# Patient Record
Sex: FEMALE | Race: WHITE | URBAN - METROPOLITAN AREA
[De-identification: names, ages, dates, MRNs, and addresses within clinical notes are randomized per-mention and may not be internally consistent; named-entity substitution may affect disease eponyms.]

---

## 2018-03-13 ENCOUNTER — APPOINTMENT (RX ONLY)
Dept: URBAN - METROPOLITAN AREA CLINIC 41 | Facility: CLINIC | Age: 73
Setting detail: DERMATOLOGY
End: 2018-03-13

## 2018-03-13 DIAGNOSIS — D485 NEOPLASM OF UNCERTAIN BEHAVIOR OF SKIN: ICD-10-CM

## 2018-03-13 DIAGNOSIS — L82.1 OTHER SEBORRHEIC KERATOSIS: ICD-10-CM

## 2018-03-13 DIAGNOSIS — Z71.89 OTHER SPECIFIED COUNSELING: ICD-10-CM

## 2018-03-13 DIAGNOSIS — L30.4 ERYTHEMA INTERTRIGO: ICD-10-CM

## 2018-03-13 DIAGNOSIS — L82.0 INFLAMED SEBORRHEIC KERATOSIS: ICD-10-CM

## 2018-03-13 PROBLEM — D48.5 NEOPLASM OF UNCERTAIN BEHAVIOR OF SKIN: Status: ACTIVE | Noted: 2018-03-13

## 2018-03-13 PROCEDURE — 99203 OFFICE O/P NEW LOW 30 MIN: CPT | Mod: 25

## 2018-03-13 PROCEDURE — 11100: CPT

## 2018-03-13 PROCEDURE — ? COUNSELING

## 2018-03-13 PROCEDURE — ? PRESCRIPTION

## 2018-03-13 PROCEDURE — ? BIOPSY BY SHAVE METHOD

## 2018-03-13 RX ORDER — KETOCONAZOLE 20 MG/G
CREAM TOPICAL QD
Qty: 1 | Refills: 1 | Status: ERX | COMMUNITY
Start: 2018-03-13

## 2018-03-13 RX ORDER — DESONIDE 0.5 MG/G
CREAM TOPICAL QD
Qty: 1 | Refills: 1 | Status: ERX | COMMUNITY
Start: 2018-03-13

## 2018-03-13 RX ADMIN — KETOCONAZOLE 1: 20 CREAM TOPICAL at 00:00

## 2018-03-13 RX ADMIN — DESONIDE 1: 0.5 CREAM TOPICAL at 00:00

## 2018-03-13 ASSESSMENT — LOCATION SIMPLE DESCRIPTION DERM
LOCATION SIMPLE: ABDOMEN
LOCATION SIMPLE: RIGHT UPPER BACK
LOCATION SIMPLE: CHEST
LOCATION SIMPLE: LEFT BREAST
LOCATION SIMPLE: RIGHT BREAST

## 2018-03-13 ASSESSMENT — LOCATION DETAILED DESCRIPTION DERM
LOCATION DETAILED: RIGHT SUPERIOR UPPER BACK
LOCATION DETAILED: LEFT RIB CAGE
LOCATION DETAILED: RIGHT LATERAL SUPERIOR CHEST
LOCATION DETAILED: RIGHT LATERAL BREAST 8-9:00 REGION
LOCATION DETAILED: LEFT INFRAMAMMARY CREASE (INNER QUADRANT)
LOCATION DETAILED: RIGHT MEDIAL UPPER BACK
LOCATION DETAILED: RIGHT INFERIOR LATERAL UPPER BACK
LOCATION DETAILED: RIGHT RIB CAGE

## 2018-03-13 ASSESSMENT — LOCATION ZONE DERM: LOCATION ZONE: TRUNK

## 2018-03-13 NOTE — PROCEDURE: BIOPSY BY SHAVE METHOD
Bill For Surgical Tray: no
Biopsy Type: H and E
Silver Nitrate Text: The wound bed was treated with silver nitrate after the biopsy was performed.
Wound Care: Vaseline
Electrodesiccation And Curettage Text: The wound bed was treated with electrodesiccation and curettage after the biopsy was performed.
Dressing: Band-Aid
Hemostasis: Drysol
Detail Level: Detailed
Notification Instructions: Patient will be notified of biopsy results. However, patient instructed to call the office if not contacted within 2 weeks.
Additional Anesthesia Volume In Cc (Will Not Render If 0): 0
Type Of Destruction Used: Silver Nitrate
Size Of Lesion In Cm: 0.6
Anesthesia Volume In Cc: 1
Post-Care Instructions: I reviewed with the patient in detail post-care instructions. Patient is to keep the biopsy site dry overnight, and then apply bacitracin twice daily until healed. Patient may apply hydrogen peroxide soaks to remove any crusting.
Consent: Written consent was obtained and risks were reviewed including but not limited to scarring, infection, bleeding, scabbing, incomplete removal, nerve damage and allergy to anesthesia.
Cryotherapy Text: The wound bed was treated with cryotherapy after the biopsy was performed.
Curettage Text: The wound bed was treated with curettage after the biopsy was performed.
Electrodesiccation Text: The wound bed was treated with electrodesiccation after the biopsy was performed.
Biopsy Method: Personna blade
Anesthesia Type: 1% lidocaine without epinephrine
Billing Type: Third-Party Bill

## 2018-03-28 ENCOUNTER — APPOINTMENT (RX ONLY)
Dept: URBAN - METROPOLITAN AREA CLINIC 41 | Facility: CLINIC | Age: 73
Setting detail: DERMATOLOGY
End: 2018-03-28

## 2018-03-28 DIAGNOSIS — L82.0 INFLAMED SEBORRHEIC KERATOSIS: ICD-10-CM

## 2018-03-28 DIAGNOSIS — L30.9 DERMATITIS, UNSPECIFIED: ICD-10-CM | Status: WORSENING

## 2018-03-28 PROBLEM — L29.8 OTHER PRURITUS: Status: ACTIVE | Noted: 2018-03-28

## 2018-03-28 PROBLEM — L57.0 ACTINIC KERATOSIS: Status: ACTIVE | Noted: 2018-03-28

## 2018-03-28 PROBLEM — Z85.828 PERSONAL HISTORY OF OTHER MALIGNANT NEOPLASM OF SKIN: Status: ACTIVE | Noted: 2018-03-28

## 2018-03-28 PROBLEM — L23.7 ALLERGIC CONTACT DERMATITIS DUE TO PLANTS, EXCEPT FOOD: Status: ACTIVE | Noted: 2018-03-28

## 2018-03-28 PROBLEM — D48.5 NEOPLASM OF UNCERTAIN BEHAVIOR OF SKIN: Status: ACTIVE | Noted: 2018-03-28

## 2018-03-28 PROCEDURE — ? PRESCRIPTION

## 2018-03-28 PROCEDURE — ? TREATMENT REGIMEN

## 2018-03-28 PROCEDURE — 99213 OFFICE O/P EST LOW 20 MIN: CPT | Mod: 25

## 2018-03-28 PROCEDURE — ? LIQUID NITROGEN

## 2018-03-28 PROCEDURE — ? COUNSELING

## 2018-03-28 PROCEDURE — 17110 DESTRUCTION B9 LES UP TO 14: CPT

## 2018-03-28 RX ORDER — NYSTATIN 100000 [USP'U]/G
1 CREAM TOPICAL BID
Qty: 2 | Refills: 2 | Status: ERX

## 2018-03-28 ASSESSMENT — LOCATION ZONE DERM: LOCATION ZONE: TRUNK

## 2018-03-28 ASSESSMENT — LOCATION SIMPLE DESCRIPTION DERM
LOCATION SIMPLE: ABDOMEN
LOCATION SIMPLE: LEFT BREAST
LOCATION SIMPLE: RIGHT BREAST

## 2018-03-28 ASSESSMENT — LOCATION DETAILED DESCRIPTION DERM
LOCATION DETAILED: RIGHT INFRAMAMMARY CREASE (INNER QUADRANT)
LOCATION DETAILED: LEFT LATERAL BREAST 5-6:00 REGION
LOCATION DETAILED: RIGHT RIB CAGE
LOCATION DETAILED: RIGHT LATERAL BREAST 7-8:00 REGION
LOCATION DETAILED: LEFT INFRAMAMMARY CREASE (INNER QUADRANT)

## 2018-03-28 NOTE — PROCEDURE: LIQUID NITROGEN
Medical Necessity Clause: This procedure was medically necessary because the lesions that were treated were:
Duration Of Freeze Thaw-Cycle (Seconds): 5-10
Consent: The patient's consent was obtained including but not limited to risks of crusting, scabbing, blistering, scarring, darker or lighter pigmentary change, recurrence, incomplete removal and infection.
Number Of Freeze-Thaw Cycles: 2 freeze-thaw cycles
Add 52 Modifier (Optional): no
Include Z78.9 (Other Specified Conditions Influencing Health Status) As An Associated Diagnosis?: Yes
Post-Care Instructions: I reviewed with the patient in detail post-care instructions. Patient is to wear sunprotection, and avoid picking at any of the treated lesions. Pt may apply Vaseline to crusted or scabbing areas.
Medical Necessity Information: It is in your best interest to select a reason for this procedure from the list below. All of these items fulfill various CMS LCD requirements except the new and changing color options.
Detail Level: Detailed

## 2018-03-28 NOTE — PROCEDURE: TREATMENT REGIMEN
Detail Level: Detailed
Otc Regimen: Sarna Anti-Itch lotion to do liberally as many times a day as needed
Discontinue Regimen: Ketoconazole cream, Desonide cream

## 2018-04-23 ENCOUNTER — APPOINTMENT (RX ONLY)
Dept: URBAN - METROPOLITAN AREA CLINIC 2 | Facility: CLINIC | Age: 73
Setting detail: DERMATOLOGY
End: 2018-04-23

## 2018-04-23 DIAGNOSIS — L82.0 INFLAMED SEBORRHEIC KERATOSIS: ICD-10-CM

## 2018-04-23 DIAGNOSIS — L30.4 ERYTHEMA INTERTRIGO: ICD-10-CM | Status: IMPROVED

## 2018-04-23 PROCEDURE — ? TREATMENT REGIMEN

## 2018-04-23 PROCEDURE — 99213 OFFICE O/P EST LOW 20 MIN: CPT | Mod: 25

## 2018-04-23 PROCEDURE — ? COUNSELING

## 2018-04-23 PROCEDURE — ? LIQUID NITROGEN

## 2018-04-23 PROCEDURE — 17110 DESTRUCTION B9 LES UP TO 14: CPT

## 2018-04-23 PROCEDURE — ? PRESCRIPTION

## 2018-04-23 RX ORDER — NYSTATIN 100000 [USP'U]/G
1 CREAM TOPICAL EVERY OTHER DAY
Qty: 1 | Refills: 2 | Status: ERX

## 2018-04-23 ASSESSMENT — LOCATION DETAILED DESCRIPTION DERM
LOCATION DETAILED: RIGHT RIB CAGE
LOCATION DETAILED: LEFT RIB CAGE
LOCATION DETAILED: SUBXIPHOID
LOCATION DETAILED: RIGHT INFRAMAMMARY CREASE (OUTER QUADRANT)
LOCATION DETAILED: LEFT INFRAMAMMARY CREASE (INNER QUADRANT)
LOCATION DETAILED: RIGHT MEDIAL BREAST 4-5:00 REGION
LOCATION DETAILED: LEFT MEDIAL BREAST 8-9:00 REGION
LOCATION DETAILED: RIGHT INFRAMAMMARY CREASE (INNER QUADRANT)

## 2018-04-23 ASSESSMENT — LOCATION SIMPLE DESCRIPTION DERM
LOCATION SIMPLE: ABDOMEN
LOCATION SIMPLE: LEFT BREAST
LOCATION SIMPLE: RIGHT BREAST

## 2018-04-23 ASSESSMENT — LOCATION ZONE DERM: LOCATION ZONE: TRUNK

## 2018-04-23 NOTE — PROCEDURE: LIQUID NITROGEN
Post-Care Instructions: I reviewed with the patient in detail post-care instructions. Patient is to wear sunprotection, and avoid picking at any of the treated lesions. Pt may apply Vaseline to crusted or scabbing areas.
Detail Level: Detailed
Render Post-Care Instructions In Note?: no
Pared With?: 15 blade
Medical Necessity Information: It is in your best interest to select a reason for this procedure from the list below. All of these items fulfill various CMS LCD requirements except the new and changing color options.
Medical Necessity Clause: This procedure was medically necessary because the lesions that were treated were:
Number Of Freeze-Thaw Cycles: 1 freeze-thaw cycle
Consent: The patient's consent was obtained including but not limited to risks of crusting, scabbing, blistering, scarring, darker or lighter pigmentary change, recurrence, incomplete removal and infection.
Include Z78.9 (Other Specified Conditions Influencing Health Status) As An Associated Diagnosis?: Yes
Duration Of Freeze Thaw-Cycle (Seconds): 5-10

## 2019-06-10 ENCOUNTER — APPOINTMENT (RX ONLY)
Dept: URBAN - METROPOLITAN AREA CLINIC 41 | Facility: CLINIC | Age: 74
Setting detail: DERMATOLOGY
End: 2019-06-10

## 2019-06-10 DIAGNOSIS — L82.0 INFLAMED SEBORRHEIC KERATOSIS: ICD-10-CM

## 2019-06-10 DIAGNOSIS — L30.4 ERYTHEMA INTERTRIGO: ICD-10-CM

## 2019-06-10 PROCEDURE — ? COUNSELING

## 2019-06-10 PROCEDURE — 17110 DESTRUCTION B9 LES UP TO 14: CPT

## 2019-06-10 PROCEDURE — 99213 OFFICE O/P EST LOW 20 MIN: CPT | Mod: 25

## 2019-06-10 PROCEDURE — ? LIQUID NITROGEN

## 2019-06-10 ASSESSMENT — LOCATION DETAILED DESCRIPTION DERM
LOCATION DETAILED: SUBXIPHOID
LOCATION DETAILED: LEFT INFRAMAMMARY CREASE (OUTER QUADRANT)
LOCATION DETAILED: RIGHT INFRAMAMMARY CREASE (INNER QUADRANT)
LOCATION DETAILED: LEFT MID-UPPER BACK
LOCATION DETAILED: LEFT RIB CAGE
LOCATION DETAILED: RIGHT LATERAL UPPER BACK
LOCATION DETAILED: RIGHT MEDIAL BREAST 4-5:00 REGION
LOCATION DETAILED: RIGHT RIB CAGE
LOCATION DETAILED: LEFT AXILLARY TAIL OF BREAST

## 2019-06-10 ASSESSMENT — LOCATION SIMPLE DESCRIPTION DERM
LOCATION SIMPLE: LEFT UPPER BACK
LOCATION SIMPLE: ABDOMEN
LOCATION SIMPLE: RIGHT BREAST
LOCATION SIMPLE: RIGHT UPPER BACK
LOCATION SIMPLE: LEFT BREAST

## 2019-06-10 ASSESSMENT — PAIN INTENSITY VAS: HOW INTENSE IS YOUR PAIN 0 BEING NO PAIN, 10 BEING THE MOST SEVERE PAIN POSSIBLE?: 2/10 PAIN

## 2019-06-10 ASSESSMENT — SEVERITY ASSESSMENT: SEVERITY: MILD

## 2019-06-10 ASSESSMENT — LOCATION ZONE DERM: LOCATION ZONE: TRUNK

## 2019-06-10 NOTE — PROCEDURE: LIQUID NITROGEN
Medical Necessity Clause: This procedure was medically necessary because the lesions that were treated were:
Consent: The patient's consent was obtained including but not limited to risks of crusting, scabbing, blistering, scarring, darker or lighter pigmentary change, recurrence, incomplete removal and infection.
Render Post-Care Instructions In Note?: no
Detail Level: Detailed
Include Z78.9 (Other Specified Conditions Influencing Health Status) As An Associated Diagnosis?: Yes
Number Of Freeze-Thaw Cycles: 1 freeze-thaw cycle
Duration Of Freeze Thaw-Cycle (Seconds): 5
Post-Care Instructions: I reviewed with the patient in detail post-care instructions. Patient is to wear sunprotection, and avoid picking at any of the treated lesions. Pt may apply Vaseline to crusted or scabbing areas.
Medical Necessity Information: It is in your best interest to select a reason for this procedure from the list below. All of these items fulfill various CMS LCD requirements except the new and changing color options.

## 2022-03-14 ENCOUNTER — APPOINTMENT (RX ONLY)
Dept: URBAN - METROPOLITAN AREA CLINIC 41 | Facility: CLINIC | Age: 77
Setting detail: DERMATOLOGY
End: 2022-03-14

## 2022-03-14 DIAGNOSIS — L57.8 OTHER SKIN CHANGES DUE TO CHRONIC EXPOSURE TO NONIONIZING RADIATION: ICD-10-CM

## 2022-03-14 DIAGNOSIS — L82.0 INFLAMED SEBORRHEIC KERATOSIS: ICD-10-CM

## 2022-03-14 PROCEDURE — ? COUNSELING

## 2022-03-14 PROCEDURE — ? LIQUID NITROGEN

## 2022-03-14 PROCEDURE — 17111 DESTRUCTION B9 LESIONS 15/>: CPT

## 2022-03-14 PROCEDURE — 99213 OFFICE O/P EST LOW 20 MIN: CPT | Mod: 25

## 2022-03-14 ASSESSMENT — LOCATION SIMPLE DESCRIPTION DERM
LOCATION SIMPLE: RIGHT LOWER BACK
LOCATION SIMPLE: CHEST
LOCATION SIMPLE: RIGHT UPPER BACK
LOCATION SIMPLE: LEFT LOWER BACK
LOCATION SIMPLE: RIGHT EYEBROW
LOCATION SIMPLE: ABDOMEN
LOCATION SIMPLE: LEFT UPPER BACK
LOCATION SIMPLE: RIGHT BREAST

## 2022-03-14 ASSESSMENT — LOCATION ZONE DERM
LOCATION ZONE: FACE
LOCATION ZONE: TRUNK

## 2022-03-14 ASSESSMENT — LOCATION DETAILED DESCRIPTION DERM
LOCATION DETAILED: RIGHT RIB CAGE
LOCATION DETAILED: RIGHT INFRAMAMMARY CREASE
LOCATION DETAILED: RIGHT SUPERIOR LATERAL MIDBACK
LOCATION DETAILED: LEFT MID-UPPER BACK
LOCATION DETAILED: LEFT SUPERIOR LATERAL MIDBACK
LOCATION DETAILED: RIGHT MEDIAL UPPER BACK
LOCATION DETAILED: UPPER STERNUM
LOCATION DETAILED: LEFT INFERIOR LATERAL UPPER BACK
LOCATION DETAILED: LEFT INFERIOR UPPER BACK
LOCATION DETAILED: LEFT LATERAL UPPER BACK
LOCATION DETAILED: RIGHT MEDIAL EYEBROW
LOCATION DETAILED: RIGHT SUPERIOR MEDIAL UPPER BACK
LOCATION DETAILED: RIGHT LATERAL UPPER BACK
LOCATION DETAILED: RIGHT INFRAMAMMARY CREASE (OUTER QUADRANT)

## 2022-03-14 NOTE — PROCEDURE: LIQUID NITROGEN
Medical Necessity Information: It is in your best interest to select a reason for this procedure from the list below. All of these items fulfill various CMS LCD requirements except the new and changing color options.
Show Aperture Variable?: Yes
Duration Of Freeze Thaw-Cycle (Seconds): 5
Consent: The patient's consent was obtained including but not limited to risks of crusting, scabbing, blistering, scarring, darker or lighter pigmentary change, recurrence, incomplete removal and infection.
Spray Paint Technique: No
Medical Necessity Clause: This procedure was medically necessary because the lesions that were treated were:
Post-Care Instructions: I reviewed with the patient in detail post-care instructions. Patient is to wear sunprotection, and avoid picking at any of the treated lesions. Pt may apply Vaseline to crusted or scabbing areas.
Number Of Freeze-Thaw Cycles: 3 freeze-thaw cycles
Pared With?: 15 blade
Detail Level: Detailed
Spray Paint Text: The liquid nitrogen was applied to the skin utilizing a spray paint frosting technique.

## 2022-03-14 NOTE — PROCEDURE: COUNSELING
Detail Level: Detailed
Detail Level: Zone
Sunscreen Recommendations: Discussed importance of regular photo protection with sun screen and photo protective clothing

## 2022-09-26 ENCOUNTER — APPOINTMENT (RX ONLY)
Dept: URBAN - METROPOLITAN AREA CLINIC 41 | Facility: CLINIC | Age: 77
Setting detail: DERMATOLOGY
End: 2022-09-26

## 2022-09-26 DIAGNOSIS — L73.8 OTHER SPECIFIED FOLLICULAR DISORDERS: ICD-10-CM

## 2022-09-26 DIAGNOSIS — L82.0 INFLAMED SEBORRHEIC KERATOSIS: ICD-10-CM

## 2022-09-26 PROCEDURE — 17110 DESTRUCTION B9 LES UP TO 14: CPT

## 2022-09-26 PROCEDURE — ? COUNSELING

## 2022-09-26 PROCEDURE — ? LIQUID NITROGEN

## 2022-09-26 PROCEDURE — 99212 OFFICE O/P EST SF 10 MIN: CPT | Mod: 25

## 2022-09-26 ASSESSMENT — LOCATION SIMPLE DESCRIPTION DERM
LOCATION SIMPLE: RIGHT FOREHEAD
LOCATION SIMPLE: RIGHT CHEEK
LOCATION SIMPLE: RIGHT ZYGOMA
LOCATION SIMPLE: LEFT CHEEK
LOCATION SIMPLE: LEFT TEMPLE
LOCATION SIMPLE: LEFT FOREHEAD
LOCATION SIMPLE: SCALP
LOCATION SIMPLE: RIGHT TEMPLE
LOCATION SIMPLE: NOSE

## 2022-09-26 ASSESSMENT — LOCATION DETAILED DESCRIPTION DERM
LOCATION DETAILED: RIGHT SUPERIOR PREAURICULAR CHEEK
LOCATION DETAILED: RIGHT SUPERIOR LATERAL MALAR CHEEK
LOCATION DETAILED: LEFT INFERIOR NASAL CHEEK
LOCATION DETAILED: LEFT SUPERIOR FOREHEAD
LOCATION DETAILED: RIGHT SUPERIOR MEDIAL FOREHEAD
LOCATION DETAILED: LEFT LATERAL TEMPLE
LOCATION DETAILED: RIGHT CENTRAL ZYGOMA
LOCATION DETAILED: LEFT NASAL ROOT
LOCATION DETAILED: LEFT CENTRAL TEMPLE
LOCATION DETAILED: LEFT CENTRAL PARIETAL SCALP
LOCATION DETAILED: LEFT MEDIAL MALAR CHEEK
LOCATION DETAILED: NASAL DORSUM
LOCATION DETAILED: RIGHT LATERAL TEMPLE
LOCATION DETAILED: RIGHT MEDIAL FOREHEAD

## 2022-09-26 ASSESSMENT — LOCATION ZONE DERM
LOCATION ZONE: SCALP
LOCATION ZONE: NOSE
LOCATION ZONE: FACE

## 2022-09-26 NOTE — PROCEDURE: LIQUID NITROGEN
Post-Care Instructions: I reviewed with the patient in detail post-care instructions. Patient is to wear sunprotection, and avoid picking at any of the treated lesions. Pt may apply Vaseline to crusted or scabbing areas.
Render Note In Bullet Format When Appropriate: Yes
Duration Of Freeze Thaw-Cycle (Seconds): 5
Spray Paint Technique: No
Medical Necessity Clause: This procedure was medically necessary because the lesions that were treated were: spreading
Number Of Freeze-Thaw Cycles: 3 freeze-thaw cycles
Medical Necessity Information: It is in your best interest to select a reason for this procedure from the list below. All of these items fulfill various CMS LCD requirements except the new and changing color options.
Spray Paint Text: The liquid nitrogen was applied to the skin utilizing a spray paint frosting technique.
Consent: The patient's consent was obtained including but not limited to risks of crusting, scabbing, blistering, scarring, darker or lighter pigmentary change, recurrence, incomplete removal and infection.
Detail Level: Detailed